# Patient Record
Sex: MALE | Race: WHITE | NOT HISPANIC OR LATINO | URBAN - METROPOLITAN AREA
[De-identification: names, ages, dates, MRNs, and addresses within clinical notes are randomized per-mention and may not be internally consistent; named-entity substitution may affect disease eponyms.]

---

## 2024-07-24 PROBLEM — T81.89XA DELAYED SURGICAL WOUND HEALING: Status: ACTIVE | Noted: 2024-07-24

## 2024-07-24 PROBLEM — J02.9 ACUTE PHARYNGITIS: Status: ACTIVE | Noted: 2024-07-24

## 2024-07-24 PROBLEM — J30.9 ALLERGIC RHINITIS: Status: ACTIVE | Noted: 2024-07-24

## 2024-07-24 PROBLEM — R59.0 SUPRACLAVICULAR LYMPHADENOPATHY: Status: ACTIVE | Noted: 2024-07-24

## 2024-07-24 PROBLEM — H10.12 ALLERGIC CONJUNCTIVITIS OF LEFT EYE: Status: ACTIVE | Noted: 2024-07-24

## 2024-07-24 PROBLEM — J06.9 ACUTE UPPER RESPIRATORY INFECTION: Status: ACTIVE | Noted: 2024-07-24

## 2024-07-24 PROBLEM — C81.90 HODGKIN LYMPHOMA (MULTI): Status: ACTIVE | Noted: 2024-07-24

## 2024-07-24 PROBLEM — L70.0 ACNE VULGARIS: Status: ACTIVE | Noted: 2024-07-24

## 2024-07-26 ENCOUNTER — HOSPITAL ENCOUNTER (OUTPATIENT)
Dept: RADIOLOGY | Facility: HOSPITAL | Age: 24
Discharge: HOME | End: 2024-07-26
Payer: COMMERCIAL

## 2024-07-26 ENCOUNTER — HOSPITAL ENCOUNTER (OUTPATIENT)
Dept: PEDIATRIC HEMATOLOGY/ONCOLOGY | Facility: HOSPITAL | Age: 24
Discharge: HOME | End: 2024-07-26
Payer: COMMERCIAL

## 2024-07-26 VITALS
HEIGHT: 74 IN | WEIGHT: 160.72 LBS | TEMPERATURE: 97.7 F | HEART RATE: 79 BPM | DIASTOLIC BLOOD PRESSURE: 82 MMHG | BODY MASS INDEX: 20.63 KG/M2 | RESPIRATION RATE: 19 BRPM | SYSTOLIC BLOOD PRESSURE: 125 MMHG

## 2024-07-26 DIAGNOSIS — C81.10 NODULAR SCLEROSING HODGKIN'S LYMPHOMA, UNSPECIFIED BODY REGION (MULTI): Primary | ICD-10-CM

## 2024-07-26 DIAGNOSIS — C81.10 NODULAR SCLEROSING HODGKIN'S LYMPHOMA, UNSPECIFIED BODY REGION (MULTI): ICD-10-CM

## 2024-07-26 LAB
ALBUMIN SERPL BCP-MCNC: 4.6 G/DL (ref 3.4–5)
ALP SERPL-CCNC: 50 U/L (ref 33–120)
ALT SERPL W P-5'-P-CCNC: 21 U/L (ref 10–52)
ANION GAP SERPL CALC-SCNC: 12 MMOL/L (ref 10–20)
AST SERPL W P-5'-P-CCNC: 16 U/L (ref 9–39)
BASOPHILS # BLD AUTO: 0.03 X10*3/UL (ref 0–0.1)
BASOPHILS NFR BLD AUTO: 0.7 %
BILIRUB SERPL-MCNC: 0.8 MG/DL (ref 0–1.2)
BUN SERPL-MCNC: 16 MG/DL (ref 6–23)
CALCIUM SERPL-MCNC: 9.4 MG/DL (ref 8.6–10.6)
CHLORIDE SERPL-SCNC: 103 MMOL/L (ref 98–107)
CO2 SERPL-SCNC: 28 MMOL/L (ref 21–32)
CREAT SERPL-MCNC: 0.84 MG/DL (ref 0.5–1.3)
EGFRCR SERPLBLD CKD-EPI 2021: >90 ML/MIN/1.73M*2
EOSINOPHIL # BLD AUTO: 0.02 X10*3/UL (ref 0–0.7)
EOSINOPHIL NFR BLD AUTO: 0.5 %
ERYTHROCYTE [DISTWIDTH] IN BLOOD BY AUTOMATED COUNT: 12.2 % (ref 11.5–14.5)
ERYTHROCYTE [SEDIMENTATION RATE] IN BLOOD BY WESTERGREN METHOD: 2 MM/H (ref 0–15)
GLUCOSE SERPL-MCNC: 88 MG/DL (ref 74–99)
HCT VFR BLD AUTO: 44.6 % (ref 41–52)
HGB BLD-MCNC: 15.5 G/DL (ref 13.5–17.5)
IMM GRANULOCYTES # BLD AUTO: 0.01 X10*3/UL (ref 0–0.7)
IMM GRANULOCYTES NFR BLD AUTO: 0.2 % (ref 0–0.9)
LDH SERPL L TO P-CCNC: 122 U/L (ref 84–246)
LYMPHOCYTES # BLD AUTO: 1.63 X10*3/UL (ref 1.2–4.8)
LYMPHOCYTES NFR BLD AUTO: 38.4 %
MCH RBC QN AUTO: 29.2 PG (ref 26–34)
MCHC RBC AUTO-ENTMCNC: 34.8 G/DL (ref 32–36)
MCV RBC AUTO: 84 FL (ref 80–100)
MONOCYTES # BLD AUTO: 0.44 X10*3/UL (ref 0.1–1)
MONOCYTES NFR BLD AUTO: 10.4 %
NEUTROPHILS # BLD AUTO: 2.11 X10*3/UL (ref 1.2–7.7)
NEUTROPHILS NFR BLD AUTO: 49.8 %
NRBC BLD-RTO: 0 /100 WBCS (ref 0–0)
PLATELET # BLD AUTO: 170 X10*3/UL (ref 150–450)
POTASSIUM SERPL-SCNC: 4.2 MMOL/L (ref 3.5–5.3)
PROT SERPL-MCNC: 7.2 G/DL (ref 6.4–8.2)
RBC # BLD AUTO: 5.31 X10*6/UL (ref 4.5–5.9)
SODIUM SERPL-SCNC: 139 MMOL/L (ref 136–145)
WBC # BLD AUTO: 4.2 X10*3/UL (ref 4.4–11.3)

## 2024-07-26 PROCEDURE — 84075 ASSAY ALKALINE PHOSPHATASE: CPT | Performed by: PEDIATRICS

## 2024-07-26 PROCEDURE — 36415 COLL VENOUS BLD VENIPUNCTURE: CPT | Performed by: PEDIATRICS

## 2024-07-26 PROCEDURE — 83615 LACTATE (LD) (LDH) ENZYME: CPT | Performed by: PEDIATRICS

## 2024-07-26 PROCEDURE — 85025 COMPLETE CBC W/AUTO DIFF WBC: CPT | Performed by: PEDIATRICS

## 2024-07-26 PROCEDURE — 71046 X-RAY EXAM CHEST 2 VIEWS: CPT

## 2024-07-26 PROCEDURE — 71046 X-RAY EXAM CHEST 2 VIEWS: CPT | Performed by: RADIOLOGY

## 2024-07-26 PROCEDURE — 85652 RBC SED RATE AUTOMATED: CPT | Performed by: PEDIATRICS

## 2024-07-26 ASSESSMENT — ENCOUNTER SYMPTOMS
OCCASIONAL FEELINGS OF UNSTEADINESS: 0
DEPRESSION: 0
LOSS OF SENSATION IN FEET: 0

## 2024-07-26 ASSESSMENT — PAIN SCALES - GENERAL: PAINLEVEL: 4

## 2024-07-26 NOTE — PROGRESS NOTES
Patient ID: Rigoberto Yates is a 24 y.o. male.  Referring Physician: No referring provider defined for this encounter.  Primary Care Provider: Enio Gloria MD    Date of Service:  7/26/2024    SUBJECTIVE:    History of Present Illness:  Rigoberto is a 25 y/o male with past medical history of Hodgkin's Lymphoma. He was treated with ABVD x 3 cycles and completed therapy 7/23/19. He completed CT of the neck 6/17/21 which showed scattered, nonspecific cervical lymph nodes similar to prior exam. Since then, Rigoberto has been lost to follow up. He is here today for concern for disease reoccurrence.     Rigoberto is here with his mom today for his visit. Rigoberto reports that he has overall been doing well. He currently works for a [employer] shop and enjoys what he does.     Rigoberto reports that about 1 week ago, he started having right sided chest pain. He states that the pain is in the exact same spot each time and has no other associated symptoms such as shortness of breath, dizziness, nausea, or vomiting. He states that the pain does seem to present when he is moving. He denies any fever or recent illness. Denies any cough or congestion.     Rigoberto denies any abnormal bruising or bleeding.       Oncology History:    Oncology History Overview Note   04/16/2019 - seen for 5 week history of left sided supraclavicular lymphadenopathy, no other symptoms at that time - CT neck, chest, abd, pelvis completed - 4cm lymph node in neck and enlarged nodes in mediastinum.   04/17/2019 - left supraclavicular lymph node biopsy by Dr. Adamson  04/19/2019- pathology consistent with Classic Hodgkin Lymphoma, nodular sclerosis type  04/25/2019- echo; EF 69%, SF 38%  05/18/2019 - started cycle 1 of ABVD  07/03/2019- echo; SF 31% (38% prior to chemo), EF 59% (69% at the start of therapy)  07/25/2019 - EOT, completed 3 cycles of ABVD - total anthracycline dose: 132mg/m2 (doxorubicin - he did receive dexrazoxane).   09/05/2019 - echo  "repeated, EF reported as normal, SF 31%  09/18/2019 - port removed  03/12/2020 - CT chest, abd, pelvis, soft tissue neck - 3mm right upper lobe pulmonary nodule, decrease of left paratracheal lymph node - no new lymphadenopathy  09/10/2020 - CT chest, abd, pelvis, soft tissue neck - no cervical lymphadenopathy, no enlarged cervical thoracic lymph nodes, continued decrease is a now non-enlarged superior mediastinal lymph node   06/17/2021 - CT chest, abd, pelvis, soft tissue neck - no lymphadenopathy, no suspicious pulmonary nodules, scattered non specific cervical lymph nodes - similar to prior study                Past Medical / Family / Social History:  Past Medical, Family, and Social History reviewed and unchanged since the last visit.    Review of Systems   Constitutional: Negative.    HENT:  Negative.     Eyes: Negative.    Respiratory: Negative.     Cardiovascular:  Positive for chest pain (right sided, intermittent, worse with movement).   Gastrointestinal: Negative.    Endocrine: Negative.    Genitourinary: Negative.     Musculoskeletal: Negative.    Skin: Negative.    Neurological: Negative.    Hematological: Negative.    Psychiatric/Behavioral: Negative.         OBJECTIVE:    VS:  /82 (BP Location: Right arm, Patient Position: Sitting, BP Cuff Size: Large adult)   Pulse 79   Temp 36.5 °C (97.7 °F) (Tympanic)   Resp 19   Ht 1.877 m (6' 1.9\")   Wt 72.9 kg (160 lb 11.5 oz)   BMI 20.69 kg/m²   BSA: 1.95 meters squared  Pain:   0    Physical Exam  Constitutional:       General: He is not in acute distress.     Appearance: Normal appearance. He is not ill-appearing.   HENT:      Head: Normocephalic.      Right Ear: External ear normal.      Left Ear: External ear normal.      Nose: Nose normal.      Mouth/Throat:      Mouth: Mucous membranes are moist.      Pharynx: Oropharynx is clear.   Eyes:      Extraocular Movements: Extraocular movements intact.      Conjunctiva/sclera: Conjunctivae normal.    "   Pupils: Pupils are equal, round, and reactive to light.   Cardiovascular:      Rate and Rhythm: Normal rate and regular rhythm.      Heart sounds: Normal heart sounds.      Comments: Unable to reproduce chest pain/discomfort.   Pulmonary:      Effort: Pulmonary effort is normal.      Breath sounds: Normal breath sounds.   Abdominal:      General: Abdomen is flat. Bowel sounds are normal.      Palpations: Abdomen is soft.   Musculoskeletal:         General: Normal range of motion.      Cervical back: Normal range of motion and neck supple.   Lymphadenopathy:      Cervical: No cervical adenopathy.   Skin:     General: Skin is warm and dry.   Neurological:      General: No focal deficit present.      Mental Status: He is alert and oriented to person, place, and time.   Psychiatric:         Mood and Affect: Mood normal.         Behavior: Behavior normal.         Thought Content: Thought content normal.         Judgment: Judgment normal.         Performance Status:  Karnofsky: 100    Hospital Outpatient Visit on 07/26/2024   Component Date Value Ref Range Status    WBC 07/26/2024 4.2 (L)  4.4 - 11.3 x10*3/uL Final    nRBC 07/26/2024 0.0  0.0 - 0.0 /100 WBCs Final    RBC 07/26/2024 5.31  4.50 - 5.90 x10*6/uL Final    Hemoglobin 07/26/2024 15.5  13.5 - 17.5 g/dL Final    Hematocrit 07/26/2024 44.6  41.0 - 52.0 % Final    MCV 07/26/2024 84  80 - 100 fL Final    MCH 07/26/2024 29.2  26.0 - 34.0 pg Final    MCHC 07/26/2024 34.8  32.0 - 36.0 g/dL Final    RDW 07/26/2024 12.2  11.5 - 14.5 % Final    Platelets 07/26/2024 170  150 - 450 x10*3/uL Final    Neutrophils % 07/26/2024 49.8  40.0 - 80.0 % Final    Immature Granulocytes %, Automated 07/26/2024 0.2  0.0 - 0.9 % Final    Immature Granulocyte Count (IG) includes promyelocytes, myelocytes and metamyelocytes but does not include bands. Percent differential counts (%) should be interpreted in the context of the absolute cell counts (cells/UL).    Lymphocytes % 07/26/2024  38.4  13.0 - 44.0 % Final    Monocytes % 07/26/2024 10.4  2.0 - 10.0 % Final    Eosinophils % 07/26/2024 0.5  0.0 - 6.0 % Final    Basophils % 07/26/2024 0.7  0.0 - 2.0 % Final    Neutrophils Absolute 07/26/2024 2.11  1.20 - 7.70 x10*3/uL Final    Percent differential counts (%) should be interpreted in the context of the absolute cell counts (cells/uL).    Immature Granulocytes Absolute, Au* 07/26/2024 0.01  0.00 - 0.70 x10*3/uL Final    Lymphocytes Absolute 07/26/2024 1.63  1.20 - 4.80 x10*3/uL Final    Monocytes Absolute 07/26/2024 0.44  0.10 - 1.00 x10*3/uL Final    Eosinophils Absolute 07/26/2024 0.02  0.00 - 0.70 x10*3/uL Final    Basophils Absolute 07/26/2024 0.03  0.00 - 0.10 x10*3/uL Final    Glucose 07/26/2024 88  74 - 99 mg/dL Final    Sodium 07/26/2024 139  136 - 145 mmol/L Final    Potassium 07/26/2024 4.2  3.5 - 5.3 mmol/L Final    Chloride 07/26/2024 103  98 - 107 mmol/L Final    Bicarbonate 07/26/2024 28  21 - 32 mmol/L Final    Anion Gap 07/26/2024 12  10 - 20 mmol/L Final    Urea Nitrogen 07/26/2024 16  6 - 23 mg/dL Final    Creatinine 07/26/2024 0.84  0.50 - 1.30 mg/dL Final    eGFR 07/26/2024 >90  >60 mL/min/1.73m*2 Final    Calculations of estimated GFR are performed using the 2021 CKD-EPI Study Refit equation without the race variable for the IDMS-Traceable creatinine methods.  https://jasn.asnjournals.org/content/early/2021/09/22/ASN.5227390695    Calcium 07/26/2024 9.4  8.6 - 10.6 mg/dL Final    Albumin 07/26/2024 4.6  3.4 - 5.0 g/dL Final    Alkaline Phosphatase 07/26/2024 50  33 - 120 U/L Final    Total Protein 07/26/2024 7.2  6.4 - 8.2 g/dL Final    AST 07/26/2024 16  9 - 39 U/L Final    Bilirubin, Total 07/26/2024 0.8  0.0 - 1.2 mg/dL Final    ALT 07/26/2024 21  10 - 52 U/L Final    Patients treated with Sulfasalazine may generate falsely decreased results for ALT.    LDH 07/26/2024 122  84 - 246 U/L Final    Sedimentation Rate 07/26/2024 2  0 - 15 mm/h Final       Chest xray  (7/26/24):  IMPRESSION:  Unremarkable radiographic appearance of the chest without evidence  for recurrent disease.      ASSESSMENT and PLAN:  Rigoberto is a 25 y/o male with hx of Classic Hodgkin's Lymphoma, nodular type that completed 3 cycles of ABVD 7/25/19. Last visit with us was 6/2021, he has been lost to follow up. He presents today for complaints of right sided chest pain and the concern for disease reoccurrence.    Rigoberto is well appearing in clinic today. He denies any chest pain at this time. RACHID based on PE, labs, and CXR.       Hem/Onc:  - Rigoberto completed 3 cycles of ABVD 7/25/2019  - Total anthracycline dose 132mg/m2 - doxorubicin with dexrazoxane given. Echo every 5 years per COG guidelines - last echo 9/2019 -  EF reported as normal, SF 31%. Order for repeat echo placed with onco-cards.   - CT soft tissue neck - 6/17/2021 - scattered nonspecific lymph nodes - no further imaging needed   - Will RTC in 6 months for first survivorship appointment      Chest pain:  Likely musculoskeletal due to pain with movement and benign exam.   - , Sed Rate 2  - CXR negative       RTC:  Rigoberto to RTC in 6 months for first survivorship visit as well as schedule his echo when is able to complete.       ALLIE Villeda-CNP

## 2024-08-01 ASSESSMENT — ENCOUNTER SYMPTOMS
NEUROLOGICAL NEGATIVE: 1
MUSCULOSKELETAL NEGATIVE: 1
GASTROINTESTINAL NEGATIVE: 1
PSYCHIATRIC NEGATIVE: 1
HEMATOLOGIC/LYMPHATIC NEGATIVE: 1
ENDOCRINE NEGATIVE: 1
RESPIRATORY NEGATIVE: 1
CONSTITUTIONAL NEGATIVE: 1
EYES NEGATIVE: 1

## 2024-08-09 NOTE — ADDENDUM NOTE
Encounter addended by: Bryanna Mondragon MD on: 8/9/2024 7:13 PM   Actions taken: Cosign clinical note with attestation, Visit diagnoses modified, Level of Service modified

## 2024-08-12 ENCOUNTER — APPOINTMENT (OUTPATIENT)
Dept: CARDIOLOGY | Facility: HOSPITAL | Age: 24
End: 2024-08-12
Payer: COMMERCIAL

## 2024-09-19 ENCOUNTER — HOSPITAL ENCOUNTER (OUTPATIENT)
Dept: CARDIOLOGY | Facility: HOSPITAL | Age: 24
Discharge: HOME | End: 2024-09-19
Payer: COMMERCIAL

## 2024-09-19 DIAGNOSIS — Z92.21 PERSONAL HISTORY OF ANTINEOPLASTIC CHEMOTHERAPY: ICD-10-CM

## 2024-09-19 DIAGNOSIS — C81.10 NODULAR SCLEROSING HODGKIN'S LYMPHOMA, UNSPECIFIED BODY REGION (MULTI): ICD-10-CM

## 2024-09-19 LAB
AORTIC VALVE PEAK VELOCITY: 0.95 M/S
AV PEAK GRADIENT: 3.6 MMHG
AVA (PEAK VEL): 3.72 CM2
EJECTION FRACTION: 53 %
LEFT VENTRICLE INTERNAL DIMENSION DIASTOLE: 4.97 CM (ref 3.5–6)
LEFT VENTRICULAR OUTFLOW TRACT DIAMETER: 2.23 CM
MITRAL VALVE E/A RATIO: 1.52

## 2024-09-19 PROCEDURE — 76376 3D RENDER W/INTRP POSTPROCES: CPT | Mod: 52

## 2024-09-19 PROCEDURE — 93306 TTE W/DOPPLER COMPLETE: CPT | Mod: REDUCED SERVICES | Performed by: INTERNAL MEDICINE

## 2024-09-19 PROCEDURE — 76376 3D RENDER W/INTRP POSTPROCES: CPT | Mod: REDUCED SERVICES | Performed by: INTERNAL MEDICINE

## 2024-09-19 PROCEDURE — 93356 MYOCRD STRAIN IMG SPCKL TRCK: CPT | Mod: REDUCED SERVICES | Performed by: INTERNAL MEDICINE

## 2024-09-20 DIAGNOSIS — R93.1 DECREASED CARDIAC EJECTION FRACTION: Primary | ICD-10-CM

## 2024-09-20 DIAGNOSIS — I87.9 ACQUIRED ABNORMALITY OF INFERIOR VENA CAVA: ICD-10-CM

## 2024-09-20 NOTE — PROGRESS NOTES
Called Rigoberto with echo results. No answer VM left. Was able to reach his mother who attends his appointments with him, discussed results and that we would like to have Rigoberto follow up with onco-cardiology. Mom verbalized understanding. Mom will call if they have any difficulties scheduling.     Bijal Jones, ALLIE-CNP

## 2024-09-26 DIAGNOSIS — C81.10 NODULAR SCLEROSING HODGKIN'S LYMPHOMA, UNSPECIFIED BODY REGION (MULTI): Primary | ICD-10-CM

## 2024-10-02 ENCOUNTER — OFFICE VISIT (OUTPATIENT)
Dept: CARDIOLOGY | Facility: HOSPITAL | Age: 24
End: 2024-10-02
Payer: COMMERCIAL

## 2024-10-02 ENCOUNTER — LAB (OUTPATIENT)
Dept: LAB | Facility: HOSPITAL | Age: 24
End: 2024-10-02
Payer: COMMERCIAL

## 2024-10-02 VITALS
BODY MASS INDEX: 21.26 KG/M2 | RESPIRATION RATE: 17 BRPM | OXYGEN SATURATION: 99 % | DIASTOLIC BLOOD PRESSURE: 62 MMHG | TEMPERATURE: 98.2 F | HEART RATE: 73 BPM | SYSTOLIC BLOOD PRESSURE: 110 MMHG | WEIGHT: 165.12 LBS

## 2024-10-02 DIAGNOSIS — R93.1 DECREASED CARDIAC EJECTION FRACTION: ICD-10-CM

## 2024-10-02 DIAGNOSIS — C81.11 NODULAR SCLEROSIS HODGKIN LYMPHOMA OF LYMPH NODES OF NECK (MULTI): ICD-10-CM

## 2024-10-02 DIAGNOSIS — I87.9 ACQUIRED ABNORMALITY OF INFERIOR VENA CAVA: ICD-10-CM

## 2024-10-02 DIAGNOSIS — Z91.89 SEDENTARY LIFESTYLE: ICD-10-CM

## 2024-10-02 DIAGNOSIS — Z92.21 STATUS POST ADMINISTRATION OF CARDIOTOXIC CHEMOTHERAPY: ICD-10-CM

## 2024-10-02 DIAGNOSIS — Z92.21 STATUS POST ADMINISTRATION OF CARDIOTOXIC CHEMOTHERAPY: Primary | ICD-10-CM

## 2024-10-02 PROBLEM — J06.9 ACUTE UPPER RESPIRATORY INFECTION: Status: RESOLVED | Noted: 2024-07-24 | Resolved: 2024-10-02

## 2024-10-02 PROBLEM — T81.89XA DELAYED SURGICAL WOUND HEALING: Status: RESOLVED | Noted: 2024-07-24 | Resolved: 2024-10-02

## 2024-10-02 PROBLEM — J02.9 ACUTE PHARYNGITIS: Status: RESOLVED | Noted: 2024-07-24 | Resolved: 2024-10-02

## 2024-10-02 PROBLEM — H10.12 ALLERGIC CONJUNCTIVITIS OF LEFT EYE: Status: RESOLVED | Noted: 2024-07-24 | Resolved: 2024-10-02

## 2024-10-02 LAB
BNP SERPL-MCNC: 30 PG/ML (ref 0–99)
CARDIAC TROPONIN I PNL SERPL HS: <3 NG/L (ref 0–53)
CHOLEST SERPL-MCNC: 118 MG/DL (ref 0–199)
CHOLESTEROL/HDL RATIO: 3
HDLC SERPL-MCNC: 39.4 MG/DL
LDLC SERPL CALC-MCNC: 65 MG/DL
NON HDL CHOLESTEROL: 79 MG/DL (ref 0–149)
TRIGL SERPL-MCNC: 70 MG/DL (ref 0–149)
TSH SERPL-ACNC: 1.99 MIU/L (ref 0.44–3.98)
VLDL: 14 MG/DL (ref 0–40)

## 2024-10-02 PROCEDURE — 36415 COLL VENOUS BLD VENIPUNCTURE: CPT

## 2024-10-02 PROCEDURE — 84443 ASSAY THYROID STIM HORMONE: CPT

## 2024-10-02 PROCEDURE — 83880 ASSAY OF NATRIURETIC PEPTIDE: CPT

## 2024-10-02 PROCEDURE — 83718 ASSAY OF LIPOPROTEIN: CPT

## 2024-10-02 PROCEDURE — 1036F TOBACCO NON-USER: CPT | Performed by: INTERNAL MEDICINE

## 2024-10-02 PROCEDURE — 84484 ASSAY OF TROPONIN QUANT: CPT

## 2024-10-02 PROCEDURE — 99204 OFFICE O/P NEW MOD 45 MIN: CPT | Performed by: INTERNAL MEDICINE

## 2024-10-02 PROCEDURE — 99214 OFFICE O/P EST MOD 30 MIN: CPT | Performed by: INTERNAL MEDICINE

## 2024-10-02 ASSESSMENT — ENCOUNTER SYMPTOMS
PSYCHIATRIC NEGATIVE: 1
GASTROINTESTINAL NEGATIVE: 1
MUSCULOSKELETAL NEGATIVE: 1
CARDIOVASCULAR NEGATIVE: 1
EYES NEGATIVE: 1
CONSTITUTIONAL NEGATIVE: 1
RESPIRATORY NEGATIVE: 1
NEUROLOGICAL NEGATIVE: 1

## 2024-10-02 ASSESSMENT — PAIN SCALES - GENERAL: PAINLEVEL: 0-NO PAIN

## 2024-10-14 ENCOUNTER — OFFICE VISIT (OUTPATIENT)
Dept: HEMATOLOGY/ONCOLOGY | Facility: HOSPITAL | Age: 24
End: 2024-10-14
Payer: COMMERCIAL

## 2024-10-14 ENCOUNTER — LAB (OUTPATIENT)
Dept: LAB | Facility: HOSPITAL | Age: 24
End: 2024-10-14
Payer: COMMERCIAL

## 2024-10-14 VITALS
DIASTOLIC BLOOD PRESSURE: 64 MMHG | BODY MASS INDEX: 20.58 KG/M2 | WEIGHT: 159.83 LBS | OXYGEN SATURATION: 98 % | TEMPERATURE: 98.8 F | RESPIRATION RATE: 16 BRPM | SYSTOLIC BLOOD PRESSURE: 118 MMHG | HEART RATE: 110 BPM

## 2024-10-14 DIAGNOSIS — Z91.89 AT RISK FOR INFERTILITY: ICD-10-CM

## 2024-10-14 DIAGNOSIS — C81.10 NODULAR SCLEROSING HODGKIN'S LYMPHOMA, UNSPECIFIED BODY REGION (MULTI): Primary | ICD-10-CM

## 2024-10-14 DIAGNOSIS — C81.10 NODULAR SCLEROSING HODGKIN'S LYMPHOMA, UNSPECIFIED BODY REGION (MULTI): ICD-10-CM

## 2024-10-14 LAB
FSH SERPL-ACNC: 9.9 IU/L
LH SERPL-ACNC: 5.4 IU/L
TESTOST SERPL-MCNC: 804 NG/DL (ref 240–1000)

## 2024-10-14 PROCEDURE — 84403 ASSAY OF TOTAL TESTOSTERONE: CPT

## 2024-10-14 PROCEDURE — 83001 ASSAY OF GONADOTROPIN (FSH): CPT

## 2024-10-14 PROCEDURE — 83002 ASSAY OF GONADOTROPIN (LH): CPT

## 2024-10-14 PROCEDURE — 1036F TOBACCO NON-USER: CPT | Performed by: NURSE PRACTITIONER

## 2024-10-14 PROCEDURE — 99213 OFFICE O/P EST LOW 20 MIN: CPT | Performed by: NURSE PRACTITIONER

## 2024-10-14 PROCEDURE — 99203 OFFICE O/P NEW LOW 30 MIN: CPT | Performed by: NURSE PRACTITIONER

## 2024-10-14 PROCEDURE — 36415 COLL VENOUS BLD VENIPUNCTURE: CPT

## 2024-10-14 ASSESSMENT — PATIENT HEALTH QUESTIONNAIRE - PHQ9
2. FEELING DOWN, DEPRESSED OR HOPELESS: NOT AT ALL
SUM OF ALL RESPONSES TO PHQ9 QUESTIONS 1 AND 2: 0
1. LITTLE INTEREST OR PLEASURE IN DOING THINGS: NOT AT ALL

## 2024-10-14 ASSESSMENT — ENCOUNTER SYMPTOMS
SLEEP DISTURBANCE: 0
DEPRESSION: 0
NERVOUS/ANXIOUS: 0

## 2024-10-14 ASSESSMENT — PAIN SCALES - GENERAL: PAINLEVEL: 0-NO PAIN

## 2024-10-14 NOTE — PROGRESS NOTES
Patient ID: Rigoberto Yates is a 24 y.o. male.  Referring Physician: Bijal Jones, ALLIE-SYLVESTER  [address]  Department of Pediatrics-Hematology [place] [address]  Primary Care Provider: Enio Gloria MD      Subjective    HPI  Referred today by: Bijal Jones CNP and Diana Dolan MD in pediatric survivorship    Oncologic Hx: Rigoberto is a 23 y/o male with past medical history of Hodgkin's Lymphoma. He was treated with ABVD x 3 cycles and completed therapy 7/23/19. He banked sperm prior to starting treatment in May 2019.     Presents today with his mother for a fertility re-evaluation. Recently had sperm transferred to long term tissue storage at [facility] after being stored at , relays high costs of storage as reason for fertility re-evaluation, and their plans to discard sample if he has no impacts to sperm count/motility.     PMH: Denies any other medical history outside of Hodgkin's Lymphoma, denies injury to head, pelvis or testicles  Psych History: none   Surg Hx: none    Sexual Development History:   Age at puberty: age 13/16, no issues with pubertal development  Hx of STI/ tract infections: no  Current sexual activity/Libido: never sexually active, denies concerns with sexual function, does achieve spontaneous am arousal, denies issues with achieving an erection or with ejaculation  Hx of infertility: none   Prior SA: yes - previously banked, one sample   Sexual orientation/gender: identifies as straight male    Family History:  Alevism heritage: none   Ethnic background: Turkish/Polish/[place]   Breast ca: MGGM 60's  Ovarian ca: no  Other gyn ca: no  Colon ca: no  MGGF lung 80's  VTE < 50 years: no  MI or stroke < 50: no  CF: no  MR: no  Sickle cell disease: no  SIblings: 1 brother and 1 sister 1/2 siblings, Children: none, Trouble conceiving or miscarriages: no  Genetic diseases/birth defects: no    Social History: Lives at home in [place] with mother and step father,  has two 1/2 siblings that are older, works full time in pizza shop, graduated high school, enjoys video games, is not currently in a relationship, denies tobacco, no vaping, no marijuana or other recreational substances, no ETOH.    Review of Systems   Genitourinary:  Negative for pelvic pain and penile discharge.    Psychiatric/Behavioral:  Negative for depression and sleep disturbance. The patient is not nervous/anxious.      Objective   BSA: 1.94 meters squared  /64 (BP Location: Left arm, Patient Position: Sitting, BP Cuff Size: Adult)   Pulse 110   Temp 37.1 °C (98.8 °F) (Temporal)   Resp 16   Wt 72.5 kg (159 lb 13.3 oz)   SpO2 98%   BMI 20.58 kg/m²     Performance Status:  Asymptomatic    No current outpatient medications    No family history on file.  Oncology History Overview Note   04/16/2019 - seen for 5 week history of left sided supraclavicular lymphadenopathy, no other symptoms at that time - CT neck, chest, abd, pelvis completed - 4cm lymph node in neck and enlarged nodes in mediastinum.   04/17/2019 - left supraclavicular lymph node biopsy by Dr. Adamson  04/19/2019- pathology consistent with Classic Hodgkin Lymphoma, nodular sclerosis type  04/25/2019- echo; EF 69%, SF 38%  05/18/2019 - started cycle 1 of ABVD  07/03/2019- echo; SF 31% (38% prior to chemo), EF 59% (69% at the start of therapy)  07/25/2019 - EOT, completed 3 cycles of ABVD - total anthracycline dose: 132mg/m2 (doxorubicin - he did receive dexrazoxane).   09/05/2019 - echo repeated, EF reported as normal, SF 31%  09/18/2019 - port removed  03/12/2020 - CT chest, abd, pelvis, soft tissue neck - 3mm right upper lobe pulmonary nodule, decrease of left paratracheal lymph node - no new lymphadenopathy  09/10/2020 - CT chest, abd, pelvis, soft tissue neck - no cervical lymphadenopathy, no enlarged cervical thoracic lymph nodes, continued decrease is a now non-enlarged superior mediastinal lymph node   06/17/2021 - CT chest, abd,  pelvis, soft tissue neck - no lymphadenopathy, no suspicious pulmonary nodules, scattered non specific cervical lymph nodes - similar to prior study                Rigoberto Yates  reports that he has never smoked. He has never used smokeless tobacco.  He  reports no history of alcohol use.  He  reports no history of drug use.    Physical Exam  Constitutional:       General: He is not in acute distress.     Appearance: Normal appearance. He is not ill-appearing.   Neurological:      Mental Status: He is alert.      Motor: No weakness.      Gait: Gait normal.   Psychiatric:         Mood and Affect: Mood normal.         Behavior: Behavior normal.         Thought Content: Thought content normal.         Judgment: Judgment normal.       No visits with results within 1 Week(s) from this visit.   Latest known visit with results is:   Lab on 10/02/2024   Component Date Value Ref Range Status    Cholesterol 10/02/2024 118  0 - 199 mg/dL Final          Age      Desirable   Borderline High   High     0-19 Y     0 - 169       170 - 199     >/= 200    20-24 Y     0 - 189       190 - 224     >/= 225         >24 Y     0 - 199       200 - 239     >/= 240   **All ranges are based on fasting samples. Specific   therapeutic targets will vary based on patient-specific   cardiac risk.    Pediatric guidelines reference:Pediatrics 2011, 128(S5).Adult guidelines reference: NCEP ATPIII Guidelines,UNA 2001, 258:2486-97    Venipuncture immediately after or during the administration of Metamizole may lead to falsely low results. Testing should be performed immediately prior to Metamizole dosing.    HDL-Cholesterol 10/02/2024 39.4  mg/dL Final      Age       Very Low   Low     Normal    High    0-19 Y    < 35      < 40     40-45     ----  20-24 Y    ----     < 40      >45      ----        >24 Y      ----     < 40     40-60      >60      Cholesterol/HDL Ratio 10/02/2024 3.0   Final      Ref Values  Desirable  < 3.4  High Risk  > 5.0    LDL  Calculated 10/02/2024 65  <=119 mg/dL Final                                Near   Borderline      AGE      Desirable  Optimal    High     High     Very High     0-19 Y     0 - 109     ---    110-129   >/= 130     ----    20-24 Y     0 - 119     ---    120-159   >/= 160     ----      >24 Y     0 -  99   100-129  130-159   160-189     >/=190      VLDL 10/02/2024 14  0 - 40 mg/dL Final    Triglycerides 10/02/2024 70  0 - 149 mg/dL Final       Age         Desirable   Borderline High   High     Very High   0 D-90 D    19 - 174         ----         ----        ----  91 D- 9 Y     0 -  74        75 -  99     >/= 100      ----    10-19 Y     0 -  89        90 - 129     >/= 130      ----    20-24 Y     0 - 114       115 - 149     >/= 150      ----         >24 Y     0 - 149       150 - 199    200- 499    >/= 500    Venipuncture immediately after or during the administration of Metamizole may lead to falsely low results. Testing should be performed immediately prior to Metamizole dosing.    Non HDL Cholesterol 10/02/2024 79  0 - 149 mg/dL Final          Age       Desirable   Borderline High   High     Very High     0-19 Y     0 - 119       120 - 144     >/= 145    >/= 160    20-24 Y     0 - 149       150 - 189     >/= 190      ----         >24 Y    30 mg/dL above LDL Cholesterol goal      Thyroid Stimulating Hormone 10/02/2024 1.99  0.44 - 3.98 mIU/L Final    BNP 10/02/2024 30  0 - 99 pg/mL Final    Troponin I, High Sensitivity ([facility]) 10/02/2024 <3  0 - 53 ng/L Final     Assessment/Plan    Rigoberto Yates is a 24 y.o. M with a history of Hodgkin's Lymphoma dx and treated in 2019 with 3 cycles of ABVD.     #Risk for infertility:   - Discussed damaging effects cancer treatments can have on sperm production, quantity, and quality and the potential impact of infertility  - Discussed potential for cancer treatments to effect male hormones, ejaculation and/or cause erectile dysfunction  - Current risk to fertility is LOW based on  treatment of ABVD x 3 cycles  - Now 5 years out from completion of treatment, we discussed fertility evaluation with hormone testing via blood and semen analysis  - Should his sample demonstrate normal sperm count and motility he may consider discarding his banked sample   - Discussed process of semen analysis, will submit sample via masturbation at  fertility center, patient should abstain from ejaculating 2-7 days to allow for most optimal sample  - Semen analysis with complete count and morphology ordered - patient to call to schedule   - Reviewed cost of annual storage at [facility] $350/annually - patient would qualify for reduced rate based on income, may consider applying for [facility] financial assistance which would bring cost down to $100/annually x 3 years  - Will follow up by phone regarding the results of his hormone testing and semen analysis once complete                   ALLIE Grewal-CNP

## 2024-11-06 ENCOUNTER — ANCILLARY PROCEDURE (OUTPATIENT)
Dept: ENDOCRINOLOGY | Facility: CLINIC | Age: 24
End: 2024-11-06
Payer: COMMERCIAL

## 2024-11-06 DIAGNOSIS — Z91.89 AT RISK FOR INFERTILITY: ICD-10-CM

## 2024-11-06 DIAGNOSIS — C81.10 NODULAR SCLEROSING HODGKIN'S LYMPHOMA, UNSPECIFIED BODY REGION (MULTI): ICD-10-CM

## 2024-11-06 LAB
% EX RESIDUAL CYTOPLASM (SEMEN): 0 %
% HEAD DEFECTS (SEMEN): 97.5 %
% NECK MIDPIECE (SEMEN): 8.5 %
% NORMAL (SEMEN): 2.5 % (ref 4–?)
% TAIL DEFECTS (SEMEN): 4.5 %
ABSTINENCE (DAYS): 5 DAYS (ref 2–7)
AGGLUTINATION (SEMEN): NO
ANALYZED TIME:: ABNORMAL
ANDROLOGY LAB ID#: ABNORMAL
CLUMPS (SEMEN): YES
COLLECTED COMPLETELY: YES
COLLECTION LOCATION:: ABNORMAL
COLLECTION METHOD:: ABNORMAL
CONCENTRATION(SEMEN): 16.58 MILL/ML (ref 15–?)
DEBRIS (SEMEN): YES
LEUKOCYTE (SEMEN): ABNORMAL
NON PROG. MOTILITY (SEMEN): 8 %
PROG. MOTILITY (SEMEN): 32 % (ref 32–?)
RECEIVED TIME:: ABNORMAL
REI PARTNER DOB: ABNORMAL
REI PARTNER NAME: ABNORMAL
SEMEN APPEARANCE: NORMAL
SEMEN LIQUEFACTION: NORMAL
SEMEN VISCOSITY: NORMAL
TOT. NO OF NORM. MOTILE SPERM (SEMEN): 0.38 MILL
TOT. NO OF NORM. SPERM (SEMEN): 0.95 MILL
TOTAL MOTILITY (SEMEN): 40 % (ref 40–?)
TOTAL NO OF MOTILE (SEMEN): 15.15 MILL
TOTAL NO OF RND CELLS (SEMEN): 0.8 MILL (ref ?–5)
TOTAL NO OF SPERM (SEMEN): 38.14 MILL (ref 39–?)
VOLUME (SEMEN): 2.3 ML (ref 1.5–?)

## 2024-11-06 PROCEDURE — 89322 SEMEN ANAL STRICT CRITERIA: CPT | Performed by: OBSTETRICS & GYNECOLOGY

## 2024-11-08 ENCOUNTER — TELEPHONE (OUTPATIENT)
Dept: ENDOCRINOLOGY | Facility: CLINIC | Age: 24
End: 2024-11-08

## 2024-11-08 NOTE — TELEPHONE ENCOUNTER
Reason for call: SA Results  Notes: pt mother called to see if results from SA has came back, and if so to go over them

## 2024-12-30 ASSESSMENT — ENCOUNTER SYMPTOMS
PSYCHIATRIC NEGATIVE: 1
HEMATOLOGIC/LYMPHATIC NEGATIVE: 1
CONSTITUTIONAL NEGATIVE: 1
RESPIRATORY NEGATIVE: 1
MUSCULOSKELETAL NEGATIVE: 1
NEUROLOGICAL NEGATIVE: 1
EYES NEGATIVE: 1
GASTROINTESTINAL NEGATIVE: 1
ENDOCRINE NEGATIVE: 1

## 2024-12-30 NOTE — PROGRESS NOTES
Patient ID: Rigoberto Yates is a 24 y.o. male.  Referring Physician: Bijal Jones, APRN-CNP  [address]  Department of Pediatrics-Hematology [place] [address]  Primary Care Provider: Enio Gloria MD    Date of Service:  1/3/2025    SUBJECTIVE:    History of Present Illness:  Rigoberto is a 23 y/o male with past medical history of Hodgkin's Lymphoma. He was treated with ABVD x 3 cycles and completed therapy 7/23/19. He completed CT of the neck 6/17/21 which showed scattered, nonspecific cervical lymph nodes similar to prior exam. Since then, Rigoberto has been lost to follow up. He is here today for concern for disease reoccurrence.     Rigoberto is here with his mom today for his visit. Rigoberto reports that he has overall been doing well. He currently works for a [employer] shop and enjoys what he does.     Rigoberto reports that about 1 week ago, he started having right sided chest pain. He states that the pain is in the exact same spot each time and has no other associated symptoms such as shortness of breath, dizziness, nausea, or vomiting. He states that the pain does seem to present when he is moving. He denies any fever or recent illness. Denies any cough or congestion.     Rigoberto denies any abnormal bruising or bleeding.       Oncology History:    Oncology History Overview Note   04/16/2019 - seen for 5 week history of left sided supraclavicular lymphadenopathy, no other symptoms at that time - CT neck, chest, abd, pelvis completed - 4cm lymph node in neck and enlarged nodes in mediastinum.   04/17/2019 - left supraclavicular lymph node biopsy by Dr. Adamson  04/19/2019- pathology consistent with Classic Hodgkin Lymphoma, nodular sclerosis type  04/25/2019- echo; EF 69%, SF 38%  05/18/2019 - started cycle 1 of ABVD  07/03/2019- echo; SF 31% (38% prior to chemo), EF 59% (69% at the start of therapy)  07/25/2019 - EOT, completed 3 cycles of ABVD - total anthracycline dose:  132mg/m2 (doxorubicin - he did receive dexrazoxane).   09/05/2019 - echo repeated, EF reported as normal, SF 31%  09/18/2019 - port removed  03/12/2020 - CT chest, abd, pelvis, soft tissue neck - 3mm right upper lobe pulmonary nodule, decrease of left paratracheal lymph node - no new lymphadenopathy  09/10/2020 - CT chest, abd, pelvis, soft tissue neck - no cervical lymphadenopathy, no enlarged cervical thoracic lymph nodes, continued decrease is a now non-enlarged superior mediastinal lymph node   06/17/2021 - CT chest, abd, pelvis, soft tissue neck - no lymphadenopathy, no suspicious pulmonary nodules, scattered non specific cervical lymph nodes - similar to prior study                Past Medical / Family / Social History:  Past Medical, Family, and Social History reviewed and unchanged since the last visit.    Review of Systems   Constitutional: Negative.    HENT:  Negative.     Eyes: Negative.    Respiratory: Negative.     Cardiovascular:  Positive for chest pain (right sided, intermittent, worse with movement).   Gastrointestinal: Negative.    Endocrine: Negative.    Genitourinary: Negative.     Musculoskeletal: Negative.    Skin: Negative.    Neurological: Negative.    Hematological: Negative.    Psychiatric/Behavioral: Negative.         OBJECTIVE:    VS:  There were no vitals taken for this visit.  BSA: There is no height or weight on file to calculate BSA.  Pain:   0    Physical Exam  Constitutional:       General: He is not in acute distress.     Appearance: Normal appearance. He is not ill-appearing.   HENT:      Head: Normocephalic.      Right Ear: External ear normal.      Left Ear: External ear normal.      Nose: Nose normal.      Mouth/Throat:      Mouth: Mucous membranes are moist.      Pharynx: Oropharynx is clear.   Eyes:      Extraocular Movements: Extraocular movements intact.      Conjunctiva/sclera: Conjunctivae normal.      Pupils: Pupils are equal, round, and reactive to light.    Cardiovascular:      Rate and Rhythm: Normal rate and regular rhythm.      Heart sounds: Normal heart sounds.      Comments: Unable to reproduce chest pain/discomfort.   Pulmonary:      Effort: Pulmonary effort is normal.      Breath sounds: Normal breath sounds.   Abdominal:      General: Abdomen is flat. Bowel sounds are normal.      Palpations: Abdomen is soft.   Musculoskeletal:         General: Normal range of motion.      Cervical back: Normal range of motion and neck supple.   Lymphadenopathy:      Cervical: No cervical adenopathy.   Skin:     General: Skin is warm and dry.   Neurological:      General: No focal deficit present.      Mental Status: He is alert and oriented to person, place, and time.   Psychiatric:         Mood and Affect: Mood normal.         Behavior: Behavior normal.         Thought Content: Thought content normal.         Judgment: Judgment normal.         Performance Status:  Karnofsky: 100    No visits with results within 7 Day(s) from this visit.   Latest known visit with results is:   Ancillary Procedure on 2024   Component Date Value Ref Range Status    Partner Name 2024 N/A   Final    Partner  2024 N/A   Final    Andrology Lab ID# [id number]   Final    Semen Appearance 2024 Normal   Final    Semen Viscosity 2024 Normal   Final    Semen Liquefaction 2024 Normal   Final    Debris (Semen) 2024 Yes   Final    Clumps (Semen) 2024 Yes   Final    Agglutination (Semen) 2024 No   Final    Volume (Semen) 2024 2.30  >=1.5 mL Final    Concentration(Semen) 2024 16.58  >=15 mill/mL Final    Total Motility (Semen) 2024 40  >=40 % Final    Prog. Motility (Semen) 2024 32  >=32 % Final    Non Prog. Motility (Semen) 2024 8  % Final    Total No of Sperm (Semen) 2024 38.14 (A)  >=39 mill Final    Total No of Motile (Semen) 2024 15.15  mill Final    Total No of Rnd Cells (Semen) 2024 0.8   <=5 mill Final    Leukocyte (Semen) 11/06/2024 NOT PERFORMED   Final    % Normal (Semen) 11/06/2024 2.5 (A)  >=4 % Final    % Head defects (Semen) 11/06/2024 97.5  % Final    % Neck Midpiece (Semen) 11/06/2024 8.5  % Final    % Tail defects (Semen) 11/06/2024 4.5  % Final    % Ex Residual Cytoplasm (Semen) 11/06/2024 0.0  % Final    Tot. No of Norm. Sperm (Semen) 11/06/2024 0.953  mill Final    Tot. No of Norm. Motile Sperm (Ovidio* 11/06/2024 0.379  mill Final    Abstinence (days) 11/06/2024 5  2 - 7 days Final    Collected Completely 11/06/2024 Yes   Final    Collection Location 11/06/2024 Center   Final    Collection Method 11/06/2024 Masturbation   Final    Received time 11/06/2024 2:17 PM   Final    Analyzed Time 11/06/2024 2:38 PM   Final       Chest xray (7/26/24):  IMPRESSION:  Unremarkable radiographic appearance of the chest without evidence  for recurrent disease.      ASSESSMENT and PLAN:  Rigoberto is a 25 y/o male with hx of Classic Hodgkin's Lymphoma, nodular type that completed 3 cycles of ABVD 7/25/19. Last visit with us was 6/2021, he has been lost to follow up. He presents today for complaints of right sided chest pain and the concern for disease reoccurrence.    Rigoberto is well appearing in clinic today. He denies any chest pain at this time. RACHID based on PE, labs, and CXR.     Hodgkin Lymphoma CT soft tissue neck - 6/17/2021 - scattered nonspecific lymph nodes - no further imaging needed     Cardiac Cumulative Anthracycline dose 132 mg/m2 Last echo 10/24/2024 Poorly visualized anatomical structures due to suboptimal image quality. Left ventricular ejection fraction is low normal, by visual estimate at 50-55%. The inferior vena cava appears mildly dilated. Study not adequate for measuring LV strain. Folow up appointment 10/2025 per Dr. Connell    Endo:  At risk for fertility complications, impaired testosterone saw JOEY with [facility] 10/2024 Semen Anaylsis low Current risk to fertility is LOW based  on treatment of ABVD x 3 cycles     Pulmonary:  Normal PFT 2019       RTC:  Rigoberto to RTC in 6 months for first survivorship visit as well as schedule his echo when is able to complete.       Solomon Quinteros, APRN-CNP

## 2025-01-03 ENCOUNTER — APPOINTMENT (OUTPATIENT)
Dept: PEDIATRIC HEMATOLOGY/ONCOLOGY | Facility: HOSPITAL | Age: 25
End: 2025-01-03
Payer: COMMERCIAL

## 2025-01-03 DIAGNOSIS — C81.05: Primary | ICD-10-CM

## 2025-01-15 ENCOUNTER — HOSPITAL ENCOUNTER (OUTPATIENT)
Dept: PEDIATRIC HEMATOLOGY/ONCOLOGY | Facility: HOSPITAL | Age: 25
Discharge: HOME | End: 2025-01-15
Payer: COMMERCIAL

## 2025-01-15 VITALS
DIASTOLIC BLOOD PRESSURE: 74 MMHG | WEIGHT: 161.38 LBS | HEIGHT: 74 IN | HEART RATE: 87 BPM | SYSTOLIC BLOOD PRESSURE: 119 MMHG | TEMPERATURE: 98.1 F | BODY MASS INDEX: 20.71 KG/M2 | RESPIRATION RATE: 20 BRPM

## 2025-01-15 DIAGNOSIS — C81.05: ICD-10-CM

## 2025-01-15 DIAGNOSIS — C81.00 NODULAR LYMPHOCYTE PREDOMINANT HODGKIN LYMPHOMA, UNSPECIFIED BODY REGION (MULTI): Primary | ICD-10-CM

## 2025-01-15 DIAGNOSIS — C81.10 NODULAR SCLEROSING HODGKIN'S LYMPHOMA, UNSPECIFIED BODY REGION (MULTI): ICD-10-CM

## 2025-01-15 DIAGNOSIS — Z13.31 SCREENING FOR DEPRESSION: Primary | ICD-10-CM

## 2025-01-15 LAB
25(OH)D3 SERPL-MCNC: 7 NG/ML (ref 30–100)
ALBUMIN SERPL BCP-MCNC: 4.6 G/DL (ref 3.4–5)
ALP SERPL-CCNC: 53 U/L (ref 33–120)
ALT SERPL W P-5'-P-CCNC: 23 U/L (ref 10–52)
ANION GAP SERPL CALC-SCNC: 13 MMOL/L (ref 10–20)
AST SERPL W P-5'-P-CCNC: 22 U/L (ref 9–39)
BASOPHILS # BLD AUTO: 0.04 X10*3/UL (ref 0–0.1)
BASOPHILS NFR BLD AUTO: 0.9 %
BILIRUB SERPL-MCNC: 0.7 MG/DL (ref 0–1.2)
BUN SERPL-MCNC: 17 MG/DL (ref 6–23)
CALCIUM SERPL-MCNC: 9.2 MG/DL (ref 8.6–10.6)
CHLORIDE SERPL-SCNC: 103 MMOL/L (ref 98–107)
CO2 SERPL-SCNC: 25 MMOL/L (ref 21–32)
CREAT SERPL-MCNC: 0.8 MG/DL (ref 0.5–1.3)
EGFRCR SERPLBLD CKD-EPI 2021: >90 ML/MIN/1.73M*2
EOSINOPHIL # BLD AUTO: 0.04 X10*3/UL (ref 0–0.7)
EOSINOPHIL NFR BLD AUTO: 0.9 %
ERYTHROCYTE [DISTWIDTH] IN BLOOD BY AUTOMATED COUNT: 12 % (ref 11.5–14.5)
ERYTHROCYTE [SEDIMENTATION RATE] IN BLOOD BY WESTERGREN METHOD: <1 MM/H (ref 0–15)
GLUCOSE SERPL-MCNC: 89 MG/DL (ref 74–99)
HCT VFR BLD AUTO: 47 % (ref 41–52)
HGB BLD-MCNC: 16.2 G/DL (ref 13.5–17.5)
IMM GRANULOCYTES # BLD AUTO: 0.01 X10*3/UL (ref 0–0.7)
IMM GRANULOCYTES NFR BLD AUTO: 0.2 % (ref 0–0.9)
LYMPHOCYTES # BLD AUTO: 1.84 X10*3/UL (ref 1.2–4.8)
LYMPHOCYTES NFR BLD AUTO: 39.5 %
MCH RBC QN AUTO: 28.9 PG (ref 26–34)
MCHC RBC AUTO-ENTMCNC: 34.5 G/DL (ref 32–36)
MCV RBC AUTO: 84 FL (ref 80–100)
MONOCYTES # BLD AUTO: 0.47 X10*3/UL (ref 0.1–1)
MONOCYTES NFR BLD AUTO: 10.1 %
NEUTROPHILS # BLD AUTO: 2.26 X10*3/UL (ref 1.2–7.7)
NEUTROPHILS NFR BLD AUTO: 48.4 %
NRBC BLD-RTO: 0 /100 WBCS (ref 0–0)
PLATELET # BLD AUTO: 169 X10*3/UL (ref 150–450)
POTASSIUM SERPL-SCNC: 4 MMOL/L (ref 3.5–5.3)
PROT SERPL-MCNC: 7 G/DL (ref 6.4–8.2)
RBC # BLD AUTO: 5.6 X10*6/UL (ref 4.5–5.9)
SODIUM SERPL-SCNC: 137 MMOL/L (ref 136–145)
WBC # BLD AUTO: 4.7 X10*3/UL (ref 4.4–11.3)

## 2025-01-15 PROCEDURE — 36415 COLL VENOUS BLD VENIPUNCTURE: CPT

## 2025-01-15 PROCEDURE — 85652 RBC SED RATE AUTOMATED: CPT | Performed by: NURSE PRACTITIONER

## 2025-01-15 PROCEDURE — 36415 COLL VENOUS BLD VENIPUNCTURE: CPT | Performed by: NURSE PRACTITIONER

## 2025-01-15 PROCEDURE — 99213 OFFICE O/P EST LOW 20 MIN: CPT | Performed by: NURSE PRACTITIONER

## 2025-01-15 PROCEDURE — 99215 OFFICE O/P EST HI 40 MIN: CPT | Performed by: NURSE PRACTITIONER

## 2025-01-15 PROCEDURE — 85025 COMPLETE CBC W/AUTO DIFF WBC: CPT | Performed by: NURSE PRACTITIONER

## 2025-01-15 PROCEDURE — 82306 VITAMIN D 25 HYDROXY: CPT | Performed by: NURSE PRACTITIONER

## 2025-01-15 PROCEDURE — 99205 OFFICE O/P NEW HI 60 MIN: CPT | Performed by: NURSE PRACTITIONER

## 2025-01-15 PROCEDURE — 99215 OFFICE O/P EST HI 40 MIN: CPT | Mod: 25 | Performed by: NURSE PRACTITIONER

## 2025-01-15 PROCEDURE — 80053 COMPREHEN METABOLIC PANEL: CPT | Performed by: NURSE PRACTITIONER

## 2025-01-15 RX ORDER — CHOLECALCIFEROL (VITAMIN D3) 1250 MCG
50000 TABLET ORAL WEEKLY
Qty: 30 TABLET | Refills: 2 | Status: SHIPPED | OUTPATIENT
Start: 2025-01-15

## 2025-01-15 ASSESSMENT — ENCOUNTER SYMPTOMS
LOSS OF SENSATION IN FEET: 0
DEPRESSION: 0
OCCASIONAL FEELINGS OF UNSTEADINESS: 0

## 2025-01-15 ASSESSMENT — PAIN SCALES - GENERAL: PAINLEVEL_OUTOF10: 0-NO PAIN

## 2025-01-15 NOTE — PROGRESS NOTES
Patient ID: Rigoberto Yates is a 24 y.o. male.  Referring Physician: ALLIE Villeda-CNP  [address]  Department of Pediatrics-Hematology [place] [address]  Primary Care Provider: Enio Gloria MD    Date of Service:  1/15/2025    SUBJECTIVE:    History of Present Illness:  Rigoberto Yates is a 24 y.o. male who was referred by Bijal Jones APRN-* and presents with ***.  Has been feeling well since out last visit.  Cognitive, motor and/or sensory deficits no Seizures, headaches or other neurologic symptoms      Saw eye doctor in September and visit was stable.  No problems Huntington teeth pulled recently and had a cleaning       Oncology History:    Oncology History Overview Note   04/16/2019 - seen for 5 week history of left sided supraclavicular lymphadenopathy, no other symptoms at that time - CT neck, chest, abd, pelvis completed - 4cm lymph node in neck and enlarged nodes in mediastinum.   04/17/2019 - left supraclavicular lymph node biopsy by Dr. Adamson  04/19/2019- pathology consistent with Classic Hodgkin Lymphoma, nodular sclerosis type  04/25/2019- echo; EF 69%, SF 38%  05/18/2019 - started cycle 1 of ABVD  07/03/2019- echo; SF 31% (38% prior to chemo), EF 59% (69% at the start of therapy)  07/25/2019 - EOT, completed 3 cycles of ABVD - total anthracycline dose: 132mg/m2 (doxorubicin - he did receive dexrazoxane).   09/05/2019 - echo repeated, EF reported as normal, SF 31%  09/18/2019 - port removed  03/12/2020 - CT chest, abd, pelvis, soft tissue neck - 3mm right upper lobe pulmonary nodule, decrease of left paratracheal lymph node - no new lymphadenopathy  09/10/2020 - CT chest, abd, pelvis, soft tissue neck - no cervical lymphadenopathy, no enlarged cervical thoracic lymph nodes, continued decrease is a now non-enlarged superior mediastinal lymph node   06/17/2021 - CT chest, abd, pelvis, soft tissue neck - no lymphadenopathy, no suspicious pulmonary nodules, scattered  "non specific cervical lymph nodes - similar to prior study                Past Medical History: Rigoberto has a past medical history of Acute pharyngitis (07/24/2024), Delayed surgical wound healing (07/24/2024), Other conditions influencing health status, and Personal history of other diseases of the respiratory system (10/10/2019).    Surgical History:  Rigoberto has a past surgical history that includes Other surgical history (05/15/2019) and Other surgical history (05/15/2019).    Social History:  Rigoberto reports that he has never smoked. He has never used smokeless tobacco. He reports that he does not drink alcohol and does not use drugs.    Family History:  No family history on file.    Review of Systems - Oncology    OBJECTIVE:    VS:  /74 (BP Location: Right arm, Patient Position: Sitting, BP Cuff Size: Adult)   Pulse 87   Temp 36.7 °C (98.1 °F) (Oral)   Resp 20   Ht 1.87 m (6' 1.62\")   Wt 73.2 kg (161 lb 6 oz)   BMI 20.93 kg/m²   BSA: 1.95 meters squared  Pain:       Physical Exam    Performance Status:       Laboratory:  The pertinent laboratory results were reviewed and discussed with the patient.  Notably, {PED ONCOLOGY LAB RESULTS:92588}.    Pathology:  The pertinent pathology results were reviewed and discussed with the patient.  Notably, ***.    Imaging:  The pertinent imaging results were reviewed and discussed with the patient.  Notably, ***.    ASSESSMENT and PLAN:    No matching staging information was found for the patient.  {Assess/Plan SmartLinks (Optional):77370}     Treatment Plan:  [No matching plan found]    {TIP  Telehealth Consent - Complete the below for Telehealth Visits:33538}  {Telehealth Consent - Adult/Pediatric:76374}         {Attestation List for Teaching Physicians:74673}    Solomon Quinteros, APRN-CNP    " "Negative.     Cardiovascular: Negative.    All other systems reviewed and are negative.      OBJECTIVE:    VS:  /74 (BP Location: Right arm, Patient Position: Sitting, BP Cuff Size: Adult)   Pulse 87   Temp 36.7 °C (98.1 °F) (Oral)   Resp 20   Ht 1.87 m (6' 1.62\")   Wt 73.2 kg (161 lb 6 oz)   BMI 20.93 kg/m²   BSA: 1.95 meters squared  Pain:       Physical Exam  Constitutional:       Appearance: Normal appearance.   HENT:      Head: Normocephalic and atraumatic.      Nose: Nose normal.      Mouth/Throat:      Mouth: Mucous membranes are dry.      Pharynx: Oropharynx is clear.   Eyes:      Extraocular Movements: Extraocular movements intact.      Conjunctiva/sclera: Conjunctivae normal.      Pupils: Pupils are equal, round, and reactive to light.   Cardiovascular:      Rate and Rhythm: Normal rate and regular rhythm.      Pulses: Normal pulses.      Heart sounds: Normal heart sounds.   Pulmonary:      Effort: Pulmonary effort is normal.      Breath sounds: Normal breath sounds.   Abdominal:      General: Abdomen is flat. Bowel sounds are normal.      Palpations: Abdomen is soft.   Genitourinary:     Penis: Normal.       Testes: Normal.   Musculoskeletal:         General: Normal range of motion.      Cervical back: Normal range of motion and neck supple.   Skin:     General: Skin is warm and dry.      Capillary Refill: Capillary refill takes less than 2 seconds.   Neurological:      General: No focal deficit present.      Mental Status: He is alert and oriented to person, place, and time.   Psychiatric:         Mood and Affect: Mood normal.         Behavior: Behavior normal.         Performance Status:   100    Laboratory:  The pertinent laboratory results were reviewed and discussed with the patient.  Notably, Last CBC w. Diff.:    Lab Results   Component Value Date/Time    WBC 4.7 01/15/2025 1017    NRBC 0.0 01/15/2025 1017    RBC 5.60 01/15/2025 1017    HGB 16.2 01/15/2025 1017    HCT 47.0 01/15/2025 " 1017    MCV 84 01/15/2025 1017    MCH 28.9 01/15/2025 1017    MCHC 34.5 01/15/2025 1017    RDW 12.0 01/15/2025 1017     01/15/2025 1017    NEUTOPHILPCT 48.4 01/15/2025 1017    IGPCT 0.2 01/15/2025 1017    LYMPHOPCT 39.5 01/15/2025 1017    MONOPCT 10.1 01/15/2025 1017    EOSPCT 0.9 01/15/2025 1017    BASOPCT 0.9 01/15/2025 1017    NEUTROABS 2.26 01/15/2025 1017    IGABSOL 0.01 01/15/2025 1017    LYMPHSABS 1.84 01/15/2025 1017    MONOSABS 0.47 01/15/2025 1017    EOSABS 0.04 01/15/2025 1017    BASOSABS 0.04 01/15/2025 1017     Last RFP:    Lab Results   Component Value Date/Time    GLUCOSE 89 01/15/2025 1017     01/15/2025 1017    K 4.0 01/15/2025 1017     01/15/2025 1017    CO2 25 01/15/2025 1017    ANIONGAP 13 01/15/2025 1017    BUN 17 01/15/2025 1017    CREATININE 0.80 01/15/2025 1017    EGFR >90 01/15/2025 1017    CALCIUM 9.2 01/15/2025 1017    PHOS 3.9 01/14/2021 1547    ALBUMIN 4.6 01/15/2025 1017     Last HFP:    Lab Results   Component Value Date/Time    ALBUMIN 4.6 01/15/2025 1017    BILITOT 0.7 01/15/2025 1017    BILIDIR 0.2 01/14/2021 1547    ALKPHOS 53 01/15/2025 1017    ALT 23 01/15/2025 1017    AST 22 01/15/2025 1017    PROT 7.0 01/15/2025 1017   .      Imaging:  The pertinent imaging results were reviewed and discussed with the patient.  Notably  Chest xray 7/26/2024  IMPRESSION:  Unremarkable radiographic appearance of the chest without evidence  for recurrent disease.        ASSESSMENT and PLAN:  Secondary Malignancy at risk for Acute myeloid leukemia; Myelodysplasia, and Bladder malignancy due to history of Cyclophosphamide.    Labs, ROS, and Physical exam reassuring for no evidence of disease    At risk for Cardiac toxicity secondary to Chemotherapy with Anthracycline antibiotic Doxorubicin therapy (dexrazoxane) at 132 mg/m2   Recommendations are echo every 5 years.    Saw Dr. Connell in Oncocardiology who recommended annual follow up given low normal EF.  Due July 2025.    At  risk for ovarian/testicular hormonal dysfunction secondary to Alkylating Agent   Has seen oncofertility aware of impaired semen motility, reminded of safe practices to prevent unplanned pregnancy      Psychosocial: At risk for mental health disorders, impaired coping r/t history of chemotherapy.  Doing great, screens reassuring today.  No concerns      Solomon Quinteros, APRN-CNP

## 2025-01-20 NOTE — PROGRESS NOTES
Rigoberto is a 24-year-old male, diagnosed with classic Hodgkin's lymphoma, nodular sclerosis type on 04/19/2019. He was treated with ABVD x 3 cycles and completed therapy 7/23/19. He completed CT of the neck 6/17/21 which showed scattered, nonspecific cervical lymph nodes similar to prior exam. Patient was then lost to follow up. He presents in survivorship clinic today accompanied by his mother, he is well-appearing and reports that been feeling well since last visit. Consent was obtained to administer the PROMIS - Short Form 8a to assess for anxiety and depression. He scored a total of 8 for anxiety, T- Score of 38.2 with a SD of 5.7, which suggests that he is in the none-slight range for anxiety based on the general reference population. He scored a total of 8 for depression, T-Score of 37.1, and a SD of 5.5, which does not suggest depression based on the general reference population. He denies any pertinent mental health issues, distress, or concerns at this time. Patient has the survivorship team's contact information and knows how to get in touch for follow up, if necessary. Discussed contacting the emergency mental health hotline, 911 or seeking emergency services for any suicidal or homicidal ideation. Patient was informed about young adult and family resources within the community for additional support. Thank you for the referral.

## 2025-01-22 ASSESSMENT — ENCOUNTER SYMPTOMS
EYES NEGATIVE: 1
CARDIOVASCULAR NEGATIVE: 1
CONSTITUTIONAL NEGATIVE: 1
RESPIRATORY NEGATIVE: 1

## 2026-01-02 ENCOUNTER — APPOINTMENT (OUTPATIENT)
Dept: PEDIATRIC HEMATOLOGY/ONCOLOGY | Facility: HOSPITAL | Age: 26
End: 2026-01-02
Payer: COMMERCIAL

## 2026-01-23 ENCOUNTER — APPOINTMENT (OUTPATIENT)
Dept: PEDIATRIC HEMATOLOGY/ONCOLOGY | Facility: CLINIC | Age: 26
End: 2026-01-23
Payer: COMMERCIAL